# Patient Record
Sex: MALE | Race: OTHER | NOT HISPANIC OR LATINO | Employment: FULL TIME | URBAN - METROPOLITAN AREA
[De-identification: names, ages, dates, MRNs, and addresses within clinical notes are randomized per-mention and may not be internally consistent; named-entity substitution may affect disease eponyms.]

---

## 2023-02-05 ENCOUNTER — HOSPITAL ENCOUNTER (OUTPATIENT)
Facility: HOSPITAL | Age: 38
Discharge: HOME | End: 2023-02-05
Attending: STUDENT IN AN ORGANIZED HEALTH CARE EDUCATION/TRAINING PROGRAM | Admitting: INTERNAL MEDICINE
Payer: MEDICAID

## 2023-02-05 VITALS
HEART RATE: 77 BPM | SYSTOLIC BLOOD PRESSURE: 121 MMHG | RESPIRATION RATE: 18 BRPM | TEMPERATURE: 99 F | BODY MASS INDEX: 31.85 KG/M2 | OXYGEN SATURATION: 98 % | HEIGHT: 68 IN | WEIGHT: 210.13 LBS | DIASTOLIC BLOOD PRESSURE: 60 MMHG

## 2023-02-05 DIAGNOSIS — D69.2 PURPURA: ICD-10-CM

## 2023-02-05 DIAGNOSIS — L03.115 BILATERAL CELLULITIS OF LOWER LEG: ICD-10-CM

## 2023-02-05 DIAGNOSIS — L03.116 BILATERAL CELLULITIS OF LOWER LEG: ICD-10-CM

## 2023-02-05 DIAGNOSIS — N17.9 AKI (ACUTE KIDNEY INJURY): ICD-10-CM

## 2023-02-05 DIAGNOSIS — R21 RASH: ICD-10-CM

## 2023-02-05 DIAGNOSIS — S81.802A WOUND OF LEFT LOWER EXTREMITY, INITIAL ENCOUNTER: Primary | ICD-10-CM

## 2023-02-05 DIAGNOSIS — D64.9 NORMOCYTIC ANEMIA: ICD-10-CM

## 2023-02-05 DIAGNOSIS — M79.605 LEFT LEG PAIN: ICD-10-CM

## 2023-02-05 PROBLEM — L03.119 CELLULITIS OF LOWER EXTREMITY: Status: ACTIVE | Noted: 2023-02-05

## 2023-02-05 LAB
ALBUMIN SERPL BCP-MCNC: 4.4 G/DL (ref 3.5–5.2)
ALP SERPL-CCNC: 86 U/L (ref 55–135)
ALT SERPL W/O P-5'-P-CCNC: 52 U/L (ref 10–44)
ANION GAP SERPL CALC-SCNC: 13 MMOL/L (ref 8–16)
ANION GAP SERPL CALC-SCNC: 14 MMOL/L (ref 8–16)
AST SERPL-CCNC: 33 U/L (ref 10–40)
BASOPHILS # BLD AUTO: 0.08 K/UL (ref 0–0.2)
BASOPHILS NFR BLD: 0.7 % (ref 0–1.9)
BILIRUB SERPL-MCNC: 0.3 MG/DL (ref 0.1–1)
BUN SERPL-MCNC: 12 MG/DL (ref 6–20)
BUN SERPL-MCNC: 17 MG/DL (ref 6–20)
CALCIUM SERPL-MCNC: 9.6 MG/DL (ref 8.7–10.5)
CALCIUM SERPL-MCNC: 9.6 MG/DL (ref 8.7–10.5)
CHLORIDE SERPL-SCNC: 102 MMOL/L (ref 95–110)
CHLORIDE SERPL-SCNC: 104 MMOL/L (ref 95–110)
CHOLEST SERPL-MCNC: 187 MG/DL (ref 120–199)
CHOLEST/HDLC SERPL: 4.6 {RATIO} (ref 2–5)
CO2 SERPL-SCNC: 21 MMOL/L (ref 23–29)
CO2 SERPL-SCNC: 24 MMOL/L (ref 23–29)
CREAT SERPL-MCNC: 1.2 MG/DL (ref 0.5–1.4)
CREAT SERPL-MCNC: 1.5 MG/DL (ref 0.5–1.4)
DIFFERENTIAL METHOD: ABNORMAL
EOSINOPHIL # BLD AUTO: 0.2 K/UL (ref 0–0.5)
EOSINOPHIL NFR BLD: 2.2 % (ref 0–8)
ERYTHROCYTE [DISTWIDTH] IN BLOOD BY AUTOMATED COUNT: 11.9 % (ref 11.5–14.5)
EST. GFR  (NO RACE VARIABLE): >60 ML/MIN/1.73 M^2
EST. GFR  (NO RACE VARIABLE): >60 ML/MIN/1.73 M^2
ESTIMATED AVG GLUCOSE: 126 MG/DL (ref 68–131)
FERRITIN SERPL-MCNC: 300 NG/ML (ref 20–300)
FOLATE SERPL-MCNC: 14 NG/ML (ref 4–24)
GLUCOSE SERPL-MCNC: 104 MG/DL (ref 70–110)
GLUCOSE SERPL-MCNC: 108 MG/DL (ref 70–110)
HBA1C MFR BLD: 6 % (ref 4–5.6)
HCT VFR BLD AUTO: 37.1 % (ref 40–54)
HDLC SERPL-MCNC: 41 MG/DL (ref 40–75)
HDLC SERPL: 21.9 % (ref 20–50)
HGB BLD-MCNC: 12.4 G/DL (ref 14–18)
HIV 1+2 AB+HIV1 P24 AG SERPL QL IA: NORMAL
IMM GRANULOCYTES # BLD AUTO: 0.02 K/UL (ref 0–0.04)
IMM GRANULOCYTES NFR BLD AUTO: 0.2 % (ref 0–0.5)
IRON SERPL-MCNC: 25 UG/DL (ref 45–160)
LACTATE SERPL-SCNC: 1 MMOL/L (ref 0.5–2.2)
LACTATE SERPL-SCNC: 1 MMOL/L (ref 0.5–2.2)
LDLC SERPL CALC-MCNC: 118.6 MG/DL (ref 63–159)
LYMPHOCYTES # BLD AUTO: 2 K/UL (ref 1–4.8)
LYMPHOCYTES NFR BLD: 18.8 % (ref 18–48)
MCH RBC QN AUTO: 30.5 PG (ref 27–31)
MCHC RBC AUTO-ENTMCNC: 33.4 G/DL (ref 32–36)
MCV RBC AUTO: 91 FL (ref 82–98)
MONOCYTES # BLD AUTO: 0.6 K/UL (ref 0.3–1)
MONOCYTES NFR BLD: 5.2 % (ref 4–15)
NEUTROPHILS # BLD AUTO: 7.9 K/UL (ref 1.8–7.7)
NEUTROPHILS NFR BLD: 72.9 % (ref 38–73)
NONHDLC SERPL-MCNC: 146 MG/DL
NRBC BLD-RTO: 0 /100 WBC
PLATELET # BLD AUTO: 244 K/UL (ref 150–450)
PMV BLD AUTO: 11.2 FL (ref 9.2–12.9)
POTASSIUM SERPL-SCNC: 3.9 MMOL/L (ref 3.5–5.1)
POTASSIUM SERPL-SCNC: 4.3 MMOL/L (ref 3.5–5.1)
PROT SERPL-MCNC: 8.6 G/DL (ref 6–8.4)
RBC # BLD AUTO: 4.06 M/UL (ref 4.6–6.2)
SATURATED IRON: 8 % (ref 20–50)
SODIUM SERPL-SCNC: 137 MMOL/L (ref 136–145)
SODIUM SERPL-SCNC: 141 MMOL/L (ref 136–145)
TOTAL IRON BINDING CAPACITY: 323 UG/DL (ref 250–450)
TRANSFERRIN SERPL-MCNC: 218 MG/DL (ref 200–375)
TRIGL SERPL-MCNC: 137 MG/DL (ref 30–150)
TROPONIN I SERPL DL<=0.01 NG/ML-MCNC: <0.006 NG/ML (ref 0–0.03)
VIT B12 SERPL-MCNC: 369 PG/ML (ref 210–950)
WBC # BLD AUTO: 10.8 K/UL (ref 3.9–12.7)

## 2023-02-05 PROCEDURE — 80048 BASIC METABOLIC PNL TOTAL CA: CPT | Mod: XB | Performed by: INTERNAL MEDICINE

## 2023-02-05 PROCEDURE — 63600175 PHARM REV CODE 636 W HCPCS: Performed by: STUDENT IN AN ORGANIZED HEALTH CARE EDUCATION/TRAINING PROGRAM

## 2023-02-05 PROCEDURE — 82607 VITAMIN B-12: CPT | Performed by: STUDENT IN AN ORGANIZED HEALTH CARE EDUCATION/TRAINING PROGRAM

## 2023-02-05 PROCEDURE — 80061 LIPID PANEL: CPT | Performed by: STUDENT IN AN ORGANIZED HEALTH CARE EDUCATION/TRAINING PROGRAM

## 2023-02-05 PROCEDURE — G0378 HOSPITAL OBSERVATION PER HR: HCPCS

## 2023-02-05 PROCEDURE — 84484 ASSAY OF TROPONIN QUANT: CPT | Performed by: STUDENT IN AN ORGANIZED HEALTH CARE EDUCATION/TRAINING PROGRAM

## 2023-02-05 PROCEDURE — 36415 COLL VENOUS BLD VENIPUNCTURE: CPT | Performed by: STUDENT IN AN ORGANIZED HEALTH CARE EDUCATION/TRAINING PROGRAM

## 2023-02-05 PROCEDURE — 25000003 PHARM REV CODE 250: Performed by: STUDENT IN AN ORGANIZED HEALTH CARE EDUCATION/TRAINING PROGRAM

## 2023-02-05 PROCEDURE — 36415 COLL VENOUS BLD VENIPUNCTURE: CPT | Performed by: INTERNAL MEDICINE

## 2023-02-05 PROCEDURE — 83036 HEMOGLOBIN GLYCOSYLATED A1C: CPT | Performed by: STUDENT IN AN ORGANIZED HEALTH CARE EDUCATION/TRAINING PROGRAM

## 2023-02-05 PROCEDURE — 83605 ASSAY OF LACTIC ACID: CPT | Mod: 91 | Performed by: STUDENT IN AN ORGANIZED HEALTH CARE EDUCATION/TRAINING PROGRAM

## 2023-02-05 PROCEDURE — 82728 ASSAY OF FERRITIN: CPT | Performed by: STUDENT IN AN ORGANIZED HEALTH CARE EDUCATION/TRAINING PROGRAM

## 2023-02-05 PROCEDURE — 84466 ASSAY OF TRANSFERRIN: CPT | Performed by: STUDENT IN AN ORGANIZED HEALTH CARE EDUCATION/TRAINING PROGRAM

## 2023-02-05 PROCEDURE — 96365 THER/PROPH/DIAG IV INF INIT: CPT

## 2023-02-05 PROCEDURE — 80053 COMPREHEN METABOLIC PANEL: CPT | Performed by: STUDENT IN AN ORGANIZED HEALTH CARE EDUCATION/TRAINING PROGRAM

## 2023-02-05 PROCEDURE — 82746 ASSAY OF FOLIC ACID SERUM: CPT | Performed by: STUDENT IN AN ORGANIZED HEALTH CARE EDUCATION/TRAINING PROGRAM

## 2023-02-05 PROCEDURE — 85025 COMPLETE CBC W/AUTO DIFF WBC: CPT | Performed by: STUDENT IN AN ORGANIZED HEALTH CARE EDUCATION/TRAINING PROGRAM

## 2023-02-05 PROCEDURE — 87389 HIV-1 AG W/HIV-1&-2 AB AG IA: CPT | Performed by: STUDENT IN AN ORGANIZED HEALTH CARE EDUCATION/TRAINING PROGRAM

## 2023-02-05 PROCEDURE — 83605 ASSAY OF LACTIC ACID: CPT | Performed by: STUDENT IN AN ORGANIZED HEALTH CARE EDUCATION/TRAINING PROGRAM

## 2023-02-05 PROCEDURE — 99285 EMERGENCY DEPT VISIT HI MDM: CPT | Mod: 25

## 2023-02-05 PROCEDURE — 87040 BLOOD CULTURE FOR BACTERIA: CPT | Performed by: STUDENT IN AN ORGANIZED HEALTH CARE EDUCATION/TRAINING PROGRAM

## 2023-02-05 RX ORDER — ENOXAPARIN SODIUM 100 MG/ML
40 INJECTION SUBCUTANEOUS EVERY 24 HOURS
Status: DISCONTINUED | OUTPATIENT
Start: 2023-02-05 | End: 2023-02-05 | Stop reason: HOSPADM

## 2023-02-05 RX ORDER — HYDROCODONE BITARTRATE AND ACETAMINOPHEN 5; 325 MG/1; MG/1
1 TABLET ORAL EVERY 6 HOURS PRN
Status: DISCONTINUED | OUTPATIENT
Start: 2023-02-05 | End: 2023-02-05 | Stop reason: HOSPADM

## 2023-02-05 RX ORDER — ATORVASTATIN CALCIUM 10 MG/1
10 TABLET, FILM COATED ORAL NIGHTLY
COMMUNITY
Start: 2022-11-04

## 2023-02-05 RX ORDER — SODIUM CHLORIDE, SODIUM LACTATE, POTASSIUM CHLORIDE, CALCIUM CHLORIDE 600; 310; 30; 20 MG/100ML; MG/100ML; MG/100ML; MG/100ML
INJECTION, SOLUTION INTRAVENOUS CONTINUOUS
Status: DISCONTINUED | OUTPATIENT
Start: 2023-02-05 | End: 2023-02-05 | Stop reason: HOSPADM

## 2023-02-05 RX ORDER — METFORMIN HYDROCHLORIDE 500 MG/1
500 TABLET ORAL 2 TIMES DAILY
COMMUNITY
Start: 2022-11-04

## 2023-02-05 RX ORDER — SODIUM CHLORIDE 0.9 % (FLUSH) 0.9 %
10 SYRINGE (ML) INJECTION EVERY 12 HOURS PRN
Status: DISCONTINUED | OUTPATIENT
Start: 2023-02-05 | End: 2023-02-05 | Stop reason: HOSPADM

## 2023-02-05 RX ORDER — ACETAMINOPHEN 325 MG/1
650 TABLET ORAL EVERY 8 HOURS PRN
Status: DISCONTINUED | OUTPATIENT
Start: 2023-02-05 | End: 2023-02-05 | Stop reason: HOSPADM

## 2023-02-05 RX ADMIN — SODIUM CHLORIDE, SODIUM LACTATE, POTASSIUM CHLORIDE, AND CALCIUM CHLORIDE: .6; .31; .03; .02 INJECTION, SOLUTION INTRAVENOUS at 06:02

## 2023-02-05 RX ADMIN — VANCOMYCIN HYDROCHLORIDE 2000 MG: 500 INJECTION, POWDER, LYOPHILIZED, FOR SOLUTION INTRAVENOUS at 03:02

## 2023-02-05 NOTE — ED PROVIDER NOTES
Encounter Date: 2/5/2023    SCRIBE #1 NOTE: I, Shelia Hartmann, am scribing for, and in the presence of,  Ghassan Crowe MD. I have scribed the following portions of the note - Other sections scribed: HPI and Physical Exam.     History     Chief Complaint   Patient presents with    Leg Swelling     Patient with redness, heat and swelling to LLE.  Patient PMD placed him on Bactrim and clindamycin last week.  Patient states symptoms have been progressively getting worse despite treatment.  Redness and swelling now noted to right anterior lower leg.  Pain present in both legs.     Arron Pelaez is a 37 y.o. male who  has no past medical history on file.    The patient presents to the ED due to bilateral leg swelling and worsening redness that began last week. He was evaluated by his PCP on Tuesday (01/31) and was prescribed Bactrim. He had no improvement with the medication and returned to his PCP on Thursday. He was given Clindamycin, but still showed no improvement. He states his symptoms are progressively worsening. He mentions he had similar symptoms in October 2022 and had several tests ran. However, his PCP could not find a diagnosis. Eventually, his symptoms subsided until they reappeared last week.     The history is provided by the patient. No  was used.   Review of patient's allergies indicates:  No Known Allergies  No past medical history on file.  No past surgical history on file.  No family history on file.     Review of Systems    Physical Exam     Initial Vitals [02/05/23 0057]   BP Pulse Resp Temp SpO2   (!) 153/83 82 16 98.6 °F (37 °C) 98 %      MAP       --         Physical Exam    Nursing note and vitals reviewed.  Constitutional: He appears well-developed and well-nourished. No distress.   HENT:   Head: Normocephalic and atraumatic.   Eyes: Conjunctivae and EOM are normal.   Neck:   Normal range of motion.  Cardiovascular:  Normal rate.           Pulmonary/Chest: Effort normal. No  respiratory distress.   Abdominal: There is no guarding.   Musculoskeletal:         General: Normal range of motion.      Cervical back: Normal range of motion.      Left lower leg: Edema (left lower extremity increase circumference compared to the right) present.      Comments: Areas of erythema with central hyperpigmentation of the lower extremities, no crepitus or fluctuance   Negative homans sign bilaterally      Neurological: He is alert. No sensory deficit.   Skin: No rash noted.   Psychiatric: He has a normal mood and affect. His behavior is normal. Thought content normal.           ED Course   Procedures  Labs Reviewed   CBC W/ AUTO DIFFERENTIAL - Abnormal; Notable for the following components:       Result Value    RBC 4.06 (*)     Hemoglobin 12.4 (*)     Hematocrit 37.1 (*)     Gran # (ANC) 7.9 (*)     All other components within normal limits   COMPREHENSIVE METABOLIC PANEL - Abnormal; Notable for the following components:    CO2 21 (*)     Creatinine 1.5 (*)     Total Protein 8.6 (*)     ALT 52 (*)     All other components within normal limits   HEMOGLOBIN A1C - Abnormal; Notable for the following components:    Hemoglobin A1C 6.0 (*)     All other components within normal limits   LACTIC ACID, PLASMA   LACTIC ACID, PLASMA   TROPONIN I          Imaging Results              US Lower Extremity Veins Bilateral (Final result)  Result time 02/05/23 03:12:31      Final result by Zackary Mabry MD (02/05/23 03:12:31)                   Impression:      No evidence of deep venous thrombosis in either lower extremity.      Electronically signed by: Zackary Mabry MD  Date:    02/05/2023  Time:    03:12               Narrative:    EXAMINATION:  US LOWER EXTREMITY VEINS BILATERAL    CLINICAL HISTORY:  Pain in left leg    TECHNIQUE:  Duplex and color flow Doppler and dynamic compression was performed of the bilateral lower extremity veins was performed.    COMPARISON:  None    FINDINGS:  Right thigh veins: The  common femoral, femoral, popliteal, upper greater saphenous, and deep femoral veins are patent and free of thrombus. The veins are normally compressible and have normal phasic flow and augmentation response.    Right calf veins: The visualized calf veins are patent.    Left thigh veins: The common femoral, femoral, popliteal, upper greater saphenous, and deep femoral veins are patent and free of thrombus. The veins are normally compressible and have normal phasic flow and augmentation response.    Left calf veins: The visualized calf veins are patent.    Miscellaneous: None                                       Medications   vancomycin 2 g in dextrose 5 % 500 mL IVPB (0 mg Intravenous Stopped 2/5/23 1891)     Medical Decision Making:   Differential Diagnosis:   Cellulitis, vasculitis, venous insufficiency  Clinical Tests:   Lab Tests: Ordered and Reviewed  Radiological Study: Ordered and Reviewed  ED Management:  37-year-old male with nonhealing wounds to bilateral lower extremities.  Despite outpatient antibiotics patient has not seen improvement.  Given failure of outpatient antibiotics and ongoing symptoms have discussed case with Hospital Medicine who will continue evaluation and management.        Scribe Attestation:   Scribe #1: I performed the above scribed service and the documentation accurately describes the services I performed. I attest to the accuracy of the note.      ED Course as of 02/06/23 0124   Sun Feb 05, 2023   0235 Patient did not exhibit symptoms of decreased perfusion, become hypotensive, or have elevation of lactic acid, and as such did not require full 30 mL/kg bolus   [KB]   0342 Spoke with [facility], they will come and evaluate the patient. [KB]      ED Course User Index  [KB] Ghassan Crowe MD               I, Ghassan Crowe MD personally performed the services described in this documentation. All medical record entries made by the scribe were at my direction and in my presence.  I have  reviewed the chart and agree that the record reflects my personal performance and is accurate and complete.   1:24 AM 02/06/2023       DISCLAIMER: This note was prepared with [employer] Direct voice recognition transcription software. Garbled syntax, mangled pronouns, and other bizarre constructions may be attributed to that software system.    Clinical Impression:   Final diagnoses:  [M79.605] Left leg pain  [L03.116, L03.115] Bilateral cellulitis of lower leg        ED Disposition Condition    Observation Stable                Ghassan Crowe MD  02/06/23 0127

## 2023-02-05 NOTE — PLAN OF CARE
Received from ED via stretcher accompanied by Tosin RN. Assisted to bed. VSS, afebrile. Awake, alert and oriented x4.Cellulitis noted to BLE. Lt LE swollen. Denies pain at present. Oriented to room and equipment. Bed in lowest position, call bell in reach and bed alarm activated. Instructed to call for assistance before getting out of bed. Verbalized understanding.No distress noted.

## 2023-02-05 NOTE — ED NOTES
Called 4th floor to give report. Charge nurse said that she hasn't assigned the bed to a nurse yet

## 2023-02-05 NOTE — PLAN OF CARE
Discharge orders noted. AVS prepared with medication list, importance of medication compliance, follow up appointments, diet, home care instructions, treatment plan, self management, and when to seek medical attention. Detailed clinical reference list attached. AVS printed and handed to patient by bedside nurse. VN reviewed discharge instructions with patient using teachback method.  Allowed time for questions, all questions answered.  Patient verbalized complete understanding of discharge instructions and voices no concerns.      Discharge instructions complete.     Bedside nurse notified.

## 2023-02-05 NOTE — DISCHARGE INSTRUCTIONS
-OK to clean wounds with soap and water  -Apply xeroform dressing daily, secure with large bandaid, gauze and tape, or kerlix  -Elevate affected area and apply warm compresses as needed    -Will refer to Dermatology (within 1 week) and Wound Care as outpatient

## 2023-02-05 NOTE — PLAN OF CARE
Pt will dc with no needs noted. Pt reports that he will transport himself home upon dc. Pt had no questions or concerns for SW.     Cleared from CM . Bedside Nurse and VN notified.    SW requested wound care clinic follow up  SW requested Dermatology follow up.   Pt will schedule own PCP follow up.     02/05/23 1342   Final Note   Assessment Type Final Discharge Note   Anticipated Discharge Disposition Home   Hospital Resources/Appts/Education Provided Appointments scheduled by Navigator/Coordinator   Post-Acute Status   Discharge Delays None known at this time

## 2023-02-05 NOTE — H&P
[facility] Medicine H&P Note     Admitting Team: [facility] Hospitalist Team B  Attending Physician: Lucinda Minor MD  Resident: Jody    Date of Admit: 2/5/2023    Chief Complaint     Leg Swelling (Patient with redness, heat and swelling to LLE.  Patient PMD placed him on Bactrim and clindamycin last week.  Patient states symptoms have been progressively getting worse despite treatment.  Redness and swelling now noted to right anterior lower leg.  Pain present in both legs.)   for 5 days    Subjective:      History of Present Illness:  Arron Pelaez is a 37 y.o. M with a PMH of HTN who present for bilateral leg wounds and swelling for the past 5 days.     The patient was in their usual state of health until a few months ago, back in October he developed a wound on his R lateral ankle, started as a 'mosquito bite' then spread to a larger area. At that time, went to an urgent care, given an 'injection' and course of 7 days of bactrim. Minimally improved on bactrim, went to PCP and was switched to course of cephalexin. His leg wound then improved. However about 5 days ago on Tues, he noted his R foot wound started to worsen again with increased redness and pain, then 'spread' to his L foot. He was evaluated at that time and given bactrim which did not improve. Went to PCP on thurs, given clindamycin. He was taking both clindamycin and bactrim currently. Today, Pt came to the ED for persistently worsening pain and swelling, and difficulty walking/limping, particularly in the LLE. Pt thinks these wounds started as 'mosquito bites'. Denies fevers, chills, abdominal pain, n/v/d. No other trauma noted, no known h/o DM, no recent water activity or travel.    Past Medical History:  HTN    Past Surgical History:  None    Allergies:  Review of patient's allergies indicates:  No Known Allergies    Home Medications:  Takes one medication for BP but cannot recall name    On file but not taking?:  Lipitor 10  Metformin 500  "bid    Family History:  Noncontributory    Social History:  No smoking  Previously daily drinker, now a few times per month. No h/o withdrawals  No drugs    Review of Systems:  Pertinent positives in HPI. All other systems are reviewed and are negative.    Health Maintaince :   Primary Care Physician: Dr. Gigi Ewing    Immunizations:   TDap unsure    Flu not UTD   Immunization History   Administered Date(s) Administered    COVID-19, MRNA, LN-S, PF (Pfizer) (Purple Cap) 2021, 2021       Cancer Screening:  Not indicated     Objective:   Last 24 Hour Vital Signs:  BP  Min: 101/60  Max: 153/83  Temp  Av.4 °F (36.9 °C)  Min: 98.2 °F (36.8 °C)  Max: 98.6 °F (37 °C)  Pulse  Av.2  Min: 73  Max: 100  Resp  Av  Min: 16  Max: 18  SpO2  Av.8 %  Min: 97 %  Max: 100 %  Height  Av' 8" (172.7 cm)  Min: 5' 8" (172.7 cm)  Max: 5' 8" (172.7 cm)  Weight  Av.3 kg (210 lb 0.8 oz)  Min: 95.3 kg (210 lb)  Max: 95.3 kg (210 lb 1.6 oz)  Body mass index is 31.95 kg/m².  No intake/output data recorded.    Physical Examination:  General Appearance: alert and oriented, no acute distress.  Head: Normocephalic, atraumatic   Eyes: conjunctivae/corneas clear and non-icteric. PERRL, EOMI  Mouth: lips, tongue and mucosa normal, oropharynx clear  Neck: supple, trachea midline, thyroid not enlarged, no LAD  Lungs: CTAB; no crackles or wheezes, no increased work of breathing  Heart: regular rate and rhythm, S1, S2 normal, no murmurs  Abdomen: soft, non-distended, non-tender; bowel sounds normal  Extremities: erythematous wound present on L medial LE and R lateral LE, no drainage. L leg wound larger than R, L leg edema > R  Pulses: 2+ and symmetric   Skin: As above, see pictures  Neurologic: AAOx3          Laboratory:  Most Recent Data:  CBC:   Lab Results   Component Value Date    WBC 10.80 2023    HGB 12.4 (L) 2023    HCT 37.1 (L) 2023     2023    MCV 91 2023    RDW 11.9 " 02/05/2023     BMP:   Lab Results   Component Value Date     02/05/2023    K 4.3 02/05/2023     02/05/2023    CO2 21 (L) 02/05/2023    BUN 17 02/05/2023    CREATININE 1.5 (H) 02/05/2023     02/05/2023    CALCIUM 9.6 02/05/2023     LFTs:   Lab Results   Component Value Date    PROT 8.6 (H) 02/05/2023    ALBUMIN 4.4 02/05/2023    BILITOT 0.3 02/05/2023    AST 33 02/05/2023    ALKPHOS 86 02/05/2023    ALT 52 (H) 02/05/2023     DM:   Lab Results   Component Value Date    HGBA1C 6.0 (H) 02/05/2023    CREATININE 1.5 (H) 02/05/2023     Cardiac:   Lab Results   Component Value Date    TROPONINI <0.006 02/05/2023       Recent Labs   Lab 02/05/23 0149   WBC 10.80   HGB 12.4*   HCT 37.1*      MCV 91      K 4.3      CO2 21*   BUN 17   CREATININE 1.5*      CALCIUM 9.6   PROT 8.6*   ALBUMIN 4.4   AST 33   ALT 52*   ALKPHOS 86   TROPONINI <0.006       Microbiology Data:  Microbiology Results (last 7 days)       Procedure Component Value Units Date/Time    Blood culture x two cultures. Draw prior to antibiotics. [[id number]] Collected: 02/05/23 0210    Order Status: Sent Specimen: Blood from Peripheral, Antecubital, Right Updated: 02/05/23 0211    Blood culture x two cultures. Draw prior to antibiotics. [[id number]] Collected: 02/05/23 0150    Order Status: Sent Specimen: Blood from Peripheral, Antecubital, Left Updated: 02/05/23 0150              Other Results:      Radiology:  US Lower Extremity Veins Bilateral   Final Result      No evidence of deep venous thrombosis in either lower extremity.         Electronically signed by: Zackary Mabry MD   Date:    02/05/2023   Time:    03:12             Assessment/Plan:     Bilateral LE wounds, L > R  - Pt presenting with cellulitis wounds on L medial leg and R lateral leg, with L wound more severe. S/p multiple courses of PO abx without resolution. No known h/o diabetes or other risk factors. Afebrile, vitals stable, WBC count normal. DVT US  of LLE negative.  - blood cultures obtained  - starting IV vancomycin  - consulted wound care  - pain control, tylenol/norco prn    Normocytic anemia  - ordered iron panel, ferritin, b12, folate    Mild ABNER  - likely pre-renal  - trial of fluids and monitor    HTN  - unsure about Pt's home BP med  - BP controlled here, monitor      Diet: regular  DVT PPX: lovenox  Dispo: admit to medicine    Code Status:     Full    Autumn Vera MD  [facility] Internal medicine-Pediatrics, PGY-4      [facility] Medicine Hospitalist Pager numbers:   [facility] Hospitalist Medicine Team A (Lexi/Iván): [phone]  [facility] Hospitalist Medicine Team B (Willian/Lupe):  [phone]

## 2023-02-05 NOTE — PROGRESS NOTES
Pharmacokinetic Initial Assessment: IV Vancomycin    Assessment/Plan:    Initiate intravenous vancomycin with loading dose of 2000 mg once followed by a maintenance dose of vancomycin 1000mg IV every 12 hours  Desired empiric serum trough concentration is 10 to 15 mcg/mL  Draw vancomycin trough level 60 min prior to fourth dose on 2/6 at approximately 1500  Pharmacy will continue to follow and monitor vancomycin.      Please contact pharmacy at extension [phone] with any questions regarding this assessment.     Thank you for the consult,   Romeo Leigh       Patient brief summary:  Arron Pelaez is a 37 y.o. male initiated on antimicrobial therapy with IV Vancomycin for treatment of suspected skin & soft tissue infection    Drug Allergies:   Review of patient's allergies indicates:  No Known Allergies    Actual Body Weight:   95kg    Renal Function:   Estimated Creatinine Clearance: 74.1 mL/min (A) (based on SCr of 1.5 mg/dL (H)).,     Dialysis Method (if applicable):  N/A    CBC (last 72 hours):  Recent Labs   Lab Result Units 02/05/23  0149   WBC K/uL 10.80   Hemoglobin g/dL 12.4*   Hematocrit % 37.1*   Platelets K/uL 244   Gran % % 72.9   Lymph % % 18.8   Mono % % 5.2   Eosinophil % % 2.2   Basophil % % 0.7   Differential Method  Automated       Metabolic Panel (last 72 hours):  Recent Labs   Lab Result Units 02/05/23  0149   Sodium mmol/L 137   Potassium mmol/L 4.3   Chloride mmol/L 102   CO2 mmol/L 21*   Glucose mg/dL 108   BUN mg/dL 17   Creatinine mg/dL 1.5*   Albumin g/dL 4.4   Total Bilirubin mg/dL 0.3   Alkaline Phosphatase U/L 86   AST U/L 33   ALT U/L 52*       Drug levels (last 3 results):  No results for input(s): VANCOMYCINRA, VANCORANDOM, VANCOMYCINPE, VANCOPEAK, VANCOMYCINTR, VANCOTROUGH in the last 72 hours.    Microbiologic Results:  Microbiology Results (last 7 days)       Procedure Component Value Units Date/Time    Blood culture x two cultures. Draw prior to antibiotics. [[id number]] Collected:  02/05/23 0210    Order Status: Sent Specimen: Blood from Peripheral, Antecubital, Right Updated: 02/05/23 0211    Blood culture x two cultures. Draw prior to antibiotics. [[id number]] Collected: 02/05/23 0150    Order Status: Sent Specimen: Blood from Peripheral, Antecubital, Left Updated: 02/05/23 0150

## 2023-02-05 NOTE — DISCHARGE SUMMARY
[facility] Hospital Medicine Discharge Summary    Primary Team: [facility] Hospitalist Team A  Attending Physician: Lucinda Minor MD  Resident: Dr. Aranda   Intern: Cherrie  Date of Admit: 2/5/2023  Date of Discharge: 2/5/2023    Discharge to: Home/Self-Care  Condition: Stable    Discharge Diagnoses     Multifocal bilateral lower extremity erythema  Normocytic anemia  Mild ABNER resolved      Consultants and Procedures     Consultants:  N/A    Procedures:   N/A    Imaging:  US Lower extremity veins bilateral 02/05/23    Brief History of Present Illness     Arron Pelaez is a 37 year old man with a PMH of HTN who presented to [facility] ED on 02/05/2023 with a chief complaint of painful lower extremity swelling and redness for about a week. He was found to be afebrile in the ED with a normal white blood cell count and a mild ABNER with a creatinine of 1.5. He was given a single dose of IV vancomycin and IVF x 1. A lower extremity US was performed bilaterally showing no evidence of DVT. Wound care was consulted and his mild ABNER was resolved on discharge. Plan on discharge was to treat conservatively and follow up outpatient with dermatology for evaluation and potential biopsy.    For the full HPI please refer to the History & Physical from this admission.    Hospital Course By Problem with Pertinent Findings     Bilateral LE Focal Erythema, L > R  Pt presenting with swelling and dusky erythema on L medial leg and R lateral leg, with L wound more severe. S/p multiple courses of PO abx without resolution. No known h/o diabetes or other risk factors. Unclear etiology, differential includes cutaneous manifestation of small vessel vasculitis possibly 2/2 drug reaction, autoimmune vasculitis, or inflammatory reaction w/ hemosiderin deposits precipitated by insect bites. Low suspicion for infectious etiology given presentation.  - Non-blanching erythema; rubor present, not gravity dependent  - Afebrile, vitals stable, WBC wnl, DVT US of LLE  negative  - blood cultures obtained, follow up outpatient  - initiated on empiric vancomycin overnight, however discontinued due to low suspicion of infectious etiology  - Wound care service not available over weekend; referred for outpatient follow up  - Referred to dermatology clinic for further evaluation  - Plan to treat conservatively- warm compresses, leg elevation, compression socks      Normocytic anemia  - Hgb 12.4, MCV 91 on presentation  - Iron panel, vitamin B12, folate pending on discharge  Ferritin 300  - Follow up with PCP, consider referral for colonoscopy if persistent     Mild ABNER, resolved  - likely pre-renal, improved with volume resuscitation  - Cr 1.5 on admission, unclear baseline  - Cr improved to 1.2 following IVF  - Monitor outpatient     HTN  - unclear history, blood pressure well controlled throughout hospitalization without antihypertensives  - patient cannot recall home medications  - defer to PCP for intervention and outpatient monitoring    Prediabetes, without current long term use of insulin  - A1c 6.0  - Continue home metformin on discharge    Review of systems on day of discharge:  Review of Systems   Constitutional:  Negative for chills, diaphoresis and fever.   HENT:  Negative for hearing loss.    Eyes:  Negative for discharge and redness.   Respiratory:  Negative for shortness of breath.    Cardiovascular:  Positive for leg swelling. Negative for chest pain.   Gastrointestinal:  Negative for abdominal pain and vomiting.   Genitourinary:  Negative for flank pain.   Musculoskeletal:  Negative for joint pain.        Bilateral leg pain upon standing, no pain at rest   Skin:  Positive for itching and rash.        Lower extremity rash bilateral above the ankles   Neurological:  Negative for dizziness and weakness.      Physical exam on day of discharge:  Temp:  [98.2 °F (36.8 °C)-99.2 °F (37.3 °C)] 99.2 °F (37.3 °C)  Pulse:  [] 77  Resp:  [16-18] 18  SpO2:  [96 %-100 %] 96  %  BP: (101-153)/(54-83) 126/61     Physical Examination:  General Appearance: alert and oriented, no acute distress.  Head: Normocephalic, atraumatic   Eyes: conjunctivae/corneas clear and non-icteric. PERRL, EOMI  Mouth: lips, tongue and mucosa normal, oropharynx clear  Neck: supple, trachea midline, thyroid not enlarged, no LAD  Lungs: CTAB; no crackles or wheezes, no increased work of breathing  Heart: regular rate and rhythm, S1, S2 normal, no murmurs  Abdomen: soft, non-distended, non-tender; bowel sounds normal  Extremities: erythematous wound present on L medial LE and R lateral LE, no drainage. L leg wound larger than R, L leg edema > R  Pulses: 2+ and symmetric   Skin: As above  Neurologic: AAOx3      Vitals:    02/05/23 0819   BP: 126/61   Pulse: 77   Resp: 18   Temp: 99.2 °F (37.3 °C)        Discharge Medications        Medication List        ASK your doctor about these medications      atorvastatin 10 MG tablet  Commonly known as: LIPITOR     metFORMIN 500 MG tablet  Commonly known as: GLUCOPHAGE               Discharge Information:   Diet:  Regular    Physical Activity:  As tolerated             Instructions:  1. Take all medications as prescribed  2. Keep all follow-up appointments  3. Return to the hospital or call your primary care physicians if any worsening symptoms such as fever, chest pain, shortness of breath, return of symptoms, or any other concerns.    Follow-Up Appointments:  Dermatology   PCP

## 2023-02-05 NOTE — PLAN OF CARE
02/05/23 0901   Admission   Initial VN Admission Questions Complete   Communication Issues? None   Shift   Pain Management Interventions medication offered but refused   Virtual Nurse - Patient Verbalized Approval Of Camera Use;VN Rounding   Safety/Activity   Patient Rounds bed in low position;placement of personal items at bedside;call light in patient/parent reach;toileting offered;visualized patient   Safety Promotion/Fall Prevention Fall Risk reviewed with patient/family;assistive device/personal item within reach;side rails raised x 2;instructed to call staff for mobility   Positioning   Head of Bed (HOB) Positioning HOB at 30-45 degrees   VN cued into patient's room for introduction with patient's permission.  VN role explained and informed patient that VN would be working with bedside nurse and rest of care team.  Fall risk and bed alarm protocol education provided.  Instructed patient to call for assistance.  Patient aware and agreeable.  Patient's chart, labs and vital signs reviewed.  Allowed time for questions.  No acute distress noted.  Will continue to be available as needed.

## 2023-02-06 ENCOUNTER — TELEPHONE (OUTPATIENT)
Dept: WOUND CARE | Facility: HOSPITAL | Age: 38
End: 2023-02-06
Payer: MEDICAID

## 2023-02-10 LAB
BACTERIA BLD CULT: NORMAL
BACTERIA BLD CULT: NORMAL

## 2023-02-14 ENCOUNTER — HOSPITAL ENCOUNTER (OUTPATIENT)
Dept: WOUND CARE | Facility: HOSPITAL | Age: 38
Discharge: HOME | End: 2023-02-14
Attending: PREVENTIVE MEDICINE
Payer: MEDICAID

## 2023-02-14 VITALS
HEIGHT: 68 IN | SYSTOLIC BLOOD PRESSURE: 147 MMHG | TEMPERATURE: 98 F | DIASTOLIC BLOOD PRESSURE: 83 MMHG | BODY MASS INDEX: 31.83 KG/M2 | WEIGHT: 210 LBS | HEART RATE: 63 BPM

## 2023-02-14 DIAGNOSIS — E66.9 OBESITY (BMI 30.0-34.9): ICD-10-CM

## 2023-02-14 DIAGNOSIS — S81.802A WOUND OF LEFT LOWER EXTREMITY, INITIAL ENCOUNTER: ICD-10-CM

## 2023-02-14 DIAGNOSIS — Z87.2 HISTORY OF CELLULITIS: Primary | ICD-10-CM

## 2023-02-14 PROCEDURE — 99203 OFFICE O/P NEW LOW 30 MIN: CPT

## 2023-02-14 NOTE — PROGRESS NOTES
[facility]    Subjective:       Patient ID: Arron Pelaez is a 37 y.o. male.    Chief Complaint: Wound Consult (Left leg)    36 y/o male with history of left leg cellulitis. Now resolved. No s/s of infection at this time. Hx of DM. Treated in ER 2/5/23, admitted and discharged same day. Received 1 dose IV Vanc. Venous ultrasound BLE unremarkable. Blood cultures negative. Patient has already been seen by his PCP Dr. Vargas with plans to f/u with PCP. Return as needed.    Review of Systems   All systems were reviewed and are negative, except that mentioned in the HPI.    Objective:     Vitals:    02/14/23 0824   BP: (!) 147/83   Pulse: 63   Temp: 98.4 °F (36.9 °C)         Physical Exam  Constitutional:       Appearance: He is well-developed.   HENT:      Head: Normocephalic and atraumatic.   Eyes:      Pupils: Pupils are equal, round, and reactive to light.   Cardiovascular:      Rate and Rhythm: Normal rate.   Pulmonary:      Effort: Pulmonary effort is normal. No respiratory distress.      Breath sounds: No stridor.   Abdominal:      General: There is no distension.   Musculoskeletal:      Cervical back: Normal range of motion.   Skin:     Comments: See Synopsis for wound details   Neurological:      Mental Status: He is alert and oriented to person, place, and time.          Altered Skin Integrity 02/14/23 0800 Left medial Leg Other (comment) (Active)   02/14/23 0800   Altered Skin Integrity Present on Admission: yes   Side: Left   Orientation: medial   Location: Leg   Wound Number:    Is this injury device related?: No   Primary Wound Type: Other   Description of Altered Skin Integrity:    Ankle-Brachial Index:    Pulses:    Removal Indication and Assessment:    Wound Outcome:    (Retired) Wound Length (cm):    (Retired) Wound Width (cm):    (Retired) Depth (cm):    Wound Description (Comments):    Removal Indications:    Wound Image   02/14/23 0848   Dressing  Appearance Open to air 02/14/23 0848   Drainage Amount None 02/14/23 0848   Appearance Dry;Closed/resurfaced 02/14/23 0848   Tissue loss description Not applicable 02/14/23 0848   Red (%), Wound Tissue Color 100 % 02/14/23 0848   Periwound Area Dry 02/14/23 0848   Wound Edges Rolled/closed 02/14/23 0848   Wound Length (cm) 0 cm 02/14/23 0848   Wound Width (cm) 0 cm 02/14/23 0848   Wound Depth (cm) 0 cm 02/14/23 0848   Wound Volume (cm^3) 0 cm^3 02/14/23 0848   Wound Surface Area (cm^2) 0 cm^2 02/14/23 0848         Assessment/Plan:         ICD-10-CM ICD-9-CM   1. History of cellulitis  Z87.2 V13.3   2. Wound of left lower extremity, initial encounter  S81.802A 894.0   3. Obesity (BMI 30.0-34.9)  E66.9 278.00           Tissue pathology and/or culture taken:  [] Yes [x] No   Sharp debridement performed:   [] Yes [x] No   Labs ordered this visit:   [] Yes [x] No   Imaging ordered this visit:   [] Yes [x] No           Orders Placed This Encounter   Procedures    Ambulatory referral/consult to Wound Clinic     Standing Status:   Standing     Number of Occurrences:   1     Referral Priority:   Urgent     Referral Type:   Consultation     Referral Reason:   Specialty Services Required     Requested Specialty:   Wound Care     Number of Visits Requested:   1    Change dressing     Left leg cellulitis is resolved.  OK to moisturize site.  Follow-up with PCP.  Return to wound clinic as needed.      All questions answered.  No interventions needed.  OK to apply moisturizer to dry healed L ankle wound.  F/u as needed at the wound center.  F/u as planned with PCP.